# Patient Record
Sex: MALE | Race: WHITE | NOT HISPANIC OR LATINO | URBAN - METROPOLITAN AREA
[De-identification: names, ages, dates, MRNs, and addresses within clinical notes are randomized per-mention and may not be internally consistent; named-entity substitution may affect disease eponyms.]

---

## 2024-07-12 ENCOUNTER — HOSPITAL ENCOUNTER (EMERGENCY)
Facility: HOSPITAL | Age: 34
Discharge: HOME | End: 2024-07-12
Attending: EMERGENCY MEDICINE

## 2024-07-12 VITALS
SYSTOLIC BLOOD PRESSURE: 174 MMHG | HEIGHT: 70 IN | RESPIRATION RATE: 59 BRPM | WEIGHT: 200 LBS | TEMPERATURE: 99 F | BODY MASS INDEX: 28.63 KG/M2 | OXYGEN SATURATION: 98 % | HEART RATE: 88 BPM | DIASTOLIC BLOOD PRESSURE: 70 MMHG

## 2024-07-12 DIAGNOSIS — R09.82 ALLERGIC RHINITIS WITH POSTNASAL DRIP: ICD-10-CM

## 2024-07-12 DIAGNOSIS — R03.0 ELEVATED BLOOD PRESSURE READING: ICD-10-CM

## 2024-07-12 DIAGNOSIS — R31.9 HEMATURIA, UNSPECIFIED TYPE: Primary | ICD-10-CM

## 2024-07-12 DIAGNOSIS — J30.9 ALLERGIC RHINITIS WITH POSTNASAL DRIP: ICD-10-CM

## 2024-07-12 DIAGNOSIS — E86.0 DEHYDRATION: ICD-10-CM

## 2024-07-12 LAB
ALBUMIN SERPL BCP-MCNC: 4.5 G/DL (ref 3.5–5.2)
ALP SERPL-CCNC: 65 U/L (ref 55–135)
ALT SERPL W/O P-5'-P-CCNC: 19 U/L (ref 10–44)
ANION GAP SERPL CALC-SCNC: 13 MMOL/L (ref 8–16)
AST SERPL-CCNC: 21 U/L (ref 10–40)
BASOPHILS # BLD AUTO: 0.04 K/UL (ref 0–0.2)
BASOPHILS NFR BLD: 0.3 % (ref 0–1.9)
BILIRUB SERPL-MCNC: 1.5 MG/DL (ref 0.1–1)
BILIRUB UR QL STRIP: NEGATIVE
BUN SERPL-MCNC: 10 MG/DL (ref 6–20)
CALCIUM SERPL-MCNC: 9.7 MG/DL (ref 8.7–10.5)
CHLORIDE SERPL-SCNC: 100 MMOL/L (ref 95–110)
CK SERPL-CCNC: 103 U/L (ref 20–200)
CLARITY UR: CLEAR
CO2 SERPL-SCNC: 21 MMOL/L (ref 23–29)
COLOR UR: COLORLESS
CREAT SERPL-MCNC: 0.9 MG/DL (ref 0.5–1.4)
DIFFERENTIAL METHOD BLD: ABNORMAL
EOSINOPHIL # BLD AUTO: 0.1 K/UL (ref 0–0.5)
EOSINOPHIL NFR BLD: 0.5 % (ref 0–8)
ERYTHROCYTE [DISTWIDTH] IN BLOOD BY AUTOMATED COUNT: 11.9 % (ref 11.5–14.5)
EST. GFR  (NO RACE VARIABLE): >60 ML/MIN/1.73 M^2
GLUCOSE SERPL-MCNC: 93 MG/DL (ref 70–110)
GLUCOSE UR QL STRIP: NEGATIVE
HCT VFR BLD AUTO: 43 % (ref 40–54)
HGB BLD-MCNC: 15.2 G/DL (ref 14–18)
HGB UR QL STRIP: ABNORMAL
IMM GRANULOCYTES # BLD AUTO: 0.04 K/UL (ref 0–0.04)
IMM GRANULOCYTES NFR BLD AUTO: 0.3 % (ref 0–0.5)
KETONES UR QL STRIP: NEGATIVE
LACTATE SERPL-SCNC: 1 MMOL/L (ref 0.5–2.2)
LEUKOCYTE ESTERASE UR QL STRIP: NEGATIVE
LYMPHOCYTES # BLD AUTO: 1.1 K/UL (ref 1–4.8)
LYMPHOCYTES NFR BLD: 7.6 % (ref 18–48)
MAGNESIUM SERPL-MCNC: 1.8 MG/DL (ref 1.6–2.6)
MCH RBC QN AUTO: 33.6 PG (ref 27–31)
MCHC RBC AUTO-ENTMCNC: 35.3 G/DL (ref 32–36)
MCV RBC AUTO: 95 FL (ref 82–98)
MICROSCOPIC COMMENT: NORMAL
MONOCYTES # BLD AUTO: 1.2 K/UL (ref 0.3–1)
MONOCYTES NFR BLD: 8.2 % (ref 4–15)
NEUTROPHILS # BLD AUTO: 12.6 K/UL (ref 1.8–7.7)
NEUTROPHILS NFR BLD: 83.1 % (ref 38–73)
NITRITE UR QL STRIP: NEGATIVE
NRBC BLD-RTO: 0 /100 WBC
OHS QRS DURATION: 92 MS
OHS QTC CALCULATION: 404 MS
PH UR STRIP: 7 [PH] (ref 5–8)
PHOSPHATE SERPL-MCNC: 2.2 MG/DL (ref 2.7–4.5)
PLATELET # BLD AUTO: 201 K/UL (ref 150–450)
PMV BLD AUTO: 9.8 FL (ref 9.2–12.9)
POTASSIUM SERPL-SCNC: 3.8 MMOL/L (ref 3.5–5.1)
PROT SERPL-MCNC: 8.1 G/DL (ref 6–8.4)
PROT UR QL STRIP: NEGATIVE
RBC # BLD AUTO: 4.53 M/UL (ref 4.6–6.2)
RBC #/AREA URNS HPF: 0 /HPF (ref 0–4)
SODIUM SERPL-SCNC: 134 MMOL/L (ref 136–145)
SP GR UR STRIP: <1.005 (ref 1–1.03)
URN SPEC COLLECT METH UR: ABNORMAL
UROBILINOGEN UR STRIP-ACNC: NEGATIVE EU/DL
WBC # BLD AUTO: 15.09 K/UL (ref 3.9–12.7)

## 2024-07-12 PROCEDURE — 85025 COMPLETE CBC W/AUTO DIFF WBC: CPT

## 2024-07-12 PROCEDURE — 93005 ELECTROCARDIOGRAM TRACING: CPT

## 2024-07-12 PROCEDURE — 83735 ASSAY OF MAGNESIUM: CPT

## 2024-07-12 PROCEDURE — 80053 COMPREHEN METABOLIC PANEL: CPT

## 2024-07-12 PROCEDURE — 96361 HYDRATE IV INFUSION ADD-ON: CPT

## 2024-07-12 PROCEDURE — 83605 ASSAY OF LACTIC ACID: CPT | Performed by: EMERGENCY MEDICINE

## 2024-07-12 PROCEDURE — 93010 ELECTROCARDIOGRAM REPORT: CPT | Mod: ,,, | Performed by: INTERNAL MEDICINE

## 2024-07-12 PROCEDURE — 82550 ASSAY OF CK (CPK): CPT | Performed by: EMERGENCY MEDICINE

## 2024-07-12 PROCEDURE — 25000003 PHARM REV CODE 250

## 2024-07-12 PROCEDURE — 87491 CHLMYD TRACH DNA AMP PROBE: CPT

## 2024-07-12 PROCEDURE — 84100 ASSAY OF PHOSPHORUS: CPT

## 2024-07-12 PROCEDURE — 25000003 PHARM REV CODE 250: Performed by: EMERGENCY MEDICINE

## 2024-07-12 PROCEDURE — 99285 EMERGENCY DEPT VISIT HI MDM: CPT | Mod: 25

## 2024-07-12 PROCEDURE — 81000 URINALYSIS NONAUTO W/SCOPE: CPT

## 2024-07-12 PROCEDURE — 87591 N.GONORRHOEAE DNA AMP PROB: CPT

## 2024-07-12 PROCEDURE — 96360 HYDRATION IV INFUSION INIT: CPT

## 2024-07-12 RX ORDER — SODIUM CHLORIDE 0.65 %
1 AEROSOL, SPRAY (ML) NASAL
Qty: 30 ML | Refills: 0 | Status: SHIPPED | OUTPATIENT
Start: 2024-07-12

## 2024-07-12 RX ORDER — IBUPROFEN 600 MG/1
600 TABLET ORAL EVERY 6 HOURS PRN
Qty: 20 TABLET | Refills: 0 | Status: SHIPPED | OUTPATIENT
Start: 2024-07-12

## 2024-07-12 RX ORDER — LORATADINE 10 MG/1
10 TABLET ORAL DAILY
Qty: 60 TABLET | Refills: 0 | Status: SHIPPED | OUTPATIENT
Start: 2024-07-12 | End: 2025-07-12

## 2024-07-12 RX ORDER — FLUTICASONE PROPIONATE 50 MCG
1 SPRAY, SUSPENSION (ML) NASAL 2 TIMES DAILY
Qty: 16 G | Refills: 0 | Status: SHIPPED | OUTPATIENT
Start: 2024-07-12

## 2024-07-12 RX ORDER — ACETAMINOPHEN 500 MG
1000 TABLET ORAL EVERY 6 HOURS PRN
Qty: 30 TABLET | Refills: 0 | Status: SHIPPED | OUTPATIENT
Start: 2024-07-12

## 2024-07-12 RX ORDER — CYCLOBENZAPRINE HCL 10 MG
10 TABLET ORAL 3 TIMES DAILY PRN
Qty: 15 TABLET | Refills: 0 | Status: SHIPPED | OUTPATIENT
Start: 2024-07-12 | End: 2024-07-17

## 2024-07-12 RX ADMIN — SODIUM CHLORIDE 1000 ML: 9 INJECTION, SOLUTION INTRAVENOUS at 01:07

## 2024-07-12 RX ADMIN — SODIUM CHLORIDE 1000 ML: 9 INJECTION, SOLUTION INTRAVENOUS at 12:07

## 2024-07-12 NOTE — DISCHARGE INSTRUCTIONS
Please monitor your blood pressure at home.  Record values daily.  Please follow up with primary care physician and show them the daily values of your blood pressure at home.  Please establish a primary care physician.  You need repeat test to ensure that the blood in the urine resolves.  Please sign up for and monitor all results on [facility] patient portal.    Please return to the ED for any new, concerning symptoms, or worsening symptoms.    Please follow-up with your primary care provider within 1 day.  An ER visit can not replace the evaluation by your primary care physician.    In the emergency department it is our goal to rule out life-threatening conditions and make sure that you are safe to be discharged home.    Many medical conditions are difficult to diagnose and may be impossible to diagnosed during a single ER visit.  Many health conditions start with nonspecific symptoms and can only be diagnosed on follow-up visits with your primary care physician or specialist.    Please be aware that all medical conditions can change and we can not predict how you will be feeling tomorrow or the next day.   If you have any worsening or new symptoms you should not hesitate to return to the emergency department for re-evaluation.  Be sure to follow up with your primary care physician for recheck and review any labs or imaging that were performed today.  It is very common for us to identify non emergent incidental findings on labs and imaging which must be followed up with your primary care physician.   If you do not have a primary care physician, you may contact the 1 listed on your discharge paperwork or you can also call the [facility] appointment desk at [phone] to schedule an appointment.

## 2024-07-12 NOTE — ED NOTES
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  Pt states has had a decrease in urination and its "tea" colored.  sent pt to ED for evaluation of dehydration and possible rhabdo.  Pt is AAOx3, resp even and unlabored, skin warm and dry.  HOB elevated, SR up x 2, call bell within reach.    "

## 2024-07-12 NOTE — ED PROVIDER NOTES
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    Appearance, UA 07/12/2024 Clear  Clear Final    pH, UA 07/12/2024 7.0  5.0 - 8.0 Final    Specific Gravity, UA 07/12/2024 <1.005 (A)  1.005 - 1.030 Final    Protein, UA 07/12/2024 Negative  Negative Final    Comment: Recommend a 24 hour urine protein or a urine   protein/creatinine ratio if globulin induced proteinuria is  clinically suspected.      Glucose, UA 07/12/2024 Negative  Negative Final    Ketones, UA 07/12/2024 Negative  Negative Final    Bilirubin (UA) 07/12/2024 Negative  Negative Final    Occult Blood UA 07/12/2024 1+ (A)  Negative Final    Nitrite, UA 07/12/2024 Negative  Negative Final    Urobilinogen, UA 07/12/2024 Negative  <2.0 EU/dL Final    Leukocytes, UA 07/12/2024 Negative  Negative Final    WBC 07/12/2024 15.09 (H)  3.90 - 12.70 K/uL Final    RBC 07/12/2024 4.53 (L)  4.60 - 6.20 M/uL Final    Hemoglobin 07/12/2024 15.2  14.0 - 18.0 g/dL Final    Hematocrit 07/12/2024 43.0  40.0 - 54.0 % Final    MCV 07/12/2024 95  82 - 98 fL Final    MCH 07/12/2024 33.6 (H)  27.0 - 31.0 pg Final    MCHC 07/12/2024 35.3  32.0 - 36.0 g/dL Final    RDW 07/12/2024 11.9  11.5 - 14.5 % Final    Platelets 07/12/2024 201  150 - 450 K/uL Final    MPV 07/12/2024 9.8  9.2 - 12.9 fL Final    Immature Granulocytes 07/12/2024 0.3  0.0 - 0.5 % Final    Gran # (ANC)  07/12/2024 12.6 (H)  1.8 - 7.7 K/uL Final    Immature Grans (Abs) 07/12/2024 0.04  0.00 - 0.04 K/uL Final    Comment: Mild elevation in immature granulocytes is non specific and   can be seen in a variety of conditions including stress response,   acute inflammation, trauma and pregnancy. Correlation with other   laboratory and clinical findings is essential.      Lymph # 07/12/2024 1.1  1.0 - 4.8 K/uL Final    Mono # 07/12/2024 1.2 (H)  0.3 - 1.0 K/uL Final    Eos # 07/12/2024 0.1  0.0 - 0.5 K/uL Final    Baso # 07/12/2024 0.04  0.00 - 0.20 K/uL Final    nRBC 07/12/2024 0  0 /100 WBC Final    Gran % 07/12/2024 83.1 (H)  38.0 - 73.0 % Final    Lymph % 07/12/2024 7.6 (L)  18.0 - 48.0 % Final    Mono % 07/12/2024 8.2  4.0 - 15.0 % Final    Eosinophil % 07/12/2024 0.5  0.0 - 8.0 % Final    Basophil % 07/12/2024 0.3  0.0 - 1.9 % Final    Differential Method 07/12/2024 Automated   Final    Sodium 07/12/2024 134 (L)  136 - 145 mmol/L Final    Potassium 07/12/2024 3.8  3.5 - 5.1 mmol/L Final    Chloride 07/12/2024 100  95 - 110 mmol/L Final    CO2 07/12/2024 21 (L)  23 - 29 mmol/L Final    Glucose 07/12/2024 93  70 - 110 mg/dL Final    BUN 07/12/2024 10  6 - 20 mg/dL Final    Creatinine 07/12/2024 0.9  0.5 - 1.4 mg/dL Final    Calcium 07/12/2024 9.7  8.7 - 10.5 mg/dL Final    Total Protein 07/12/2024 8.1  6.0 - 8.4 g/dL Final    Albumin 07/12/2024 4.5  3.5 - 5.2 g/dL Final    Total Bilirubin 07/12/2024 1.5 (H)  0.1 - 1.0 mg/dL Final    Comment: For infants and newborns, interpretation of results should be based  on gestational age, weight and in agreement with clinical  observations.    Premature Infant recommended reference ranges:  Up to 24 hours.............<8.0 mg/dL  Up to 48 hours............<12.0 mg/dL  3-5 days..................<15.0 mg/dL  6-29 days.................<15.0 mg/dL      Alkaline Phosphatase 07/12/2024 65  55 - 135 U/L Final    AST 07/12/2024 21  10 - 40 U/L Final    ALT 07/12/2024 19  10 - 44 U/L Final     eGFR 07/12/2024 >60  >60 mL/min/1.73 m^2 Final    Anion Gap 07/12/2024 13  8 - 16 mmol/L Final    Magnesium 07/12/2024 1.8  1.6 - 2.6 mg/dL Final    Phosphorus 07/12/2024 2.2 (L)  2.7 - 4.5 mg/dL Final    CPK 07/12/2024 103  20 - 200 U/L Final    Lactate (Lactic Acid) 07/12/2024 1.0  0.5 - 2.2 mmol/L Final    Comment: Falsely low lactic acid results can be found in samples   containing >=13.0 mg/dL total bilirubin and/or >=3.5 mg/dL   direct bilirubin.      RBC, UA 07/12/2024 0  0 - 4 /hpf Final    Microscopic Comment 07/12/2024 SEE COMMENT   Final    Comment: Other formed elements not mentioned in the report are not   present in the microscopic examination.           Imaging Results              CT Renal Stone Study ABD Pelvis WO (Final result)  Result time 07/12/24 14:51:00      Final result by Max Kendall MD (07/12/24 14:51:00)                   Impression:      1. No emergent/acute abdominopelvic findings appreciated.  2. Submucosal fat deposition throughout the colon and terminal ileum, nonspecific findings that are most commonly seen in the setting of chronic repeated bouts of colitis.  3. Diffuse hepatic steatosis.      Electronically signed by: Max Kendall  Date:    07/12/2024  Time:    14:51               Narrative:    EXAMINATION:  CT RENAL STONE STUDY ABD PELVIS WO    CLINICAL HISTORY:  Flank pain, not otherwise specified as to site/location, laterality acuity etc..  Kidney stone suspected.  hematuria with right lower quadrant abdominal pain.  Pain abdominal unsp.    TECHNIQUE:  Low dose axial images, sagittal and coronal reformations were obtained from the lung bases to the pubic symphysis. IV and oral contrast were not utilized, limiting evaluation of soft tissue structures, vascular structures and hollow viscus organs.    COMPARISON:  None    FINDINGS:  Heart: Heart is not enlarged. No significant pericardial thickening in the field of view.    Lung Bases: Imaged portions of the lung bases are  clear.    Hepatobiliary: Diffusely decreased hepatic parenchymal attenuation.  Otherwise, the liver is normal in size and morphology without appreciable surface nodularity. No sizable suspicious mass on this limited non-enhanced exam.  Gallbladder is grossly unremarkable without radiodense stones, mural thickening, pericholecystic fluid or pericholecystic fat stranding.  No biliary dilatation.    Pancreas: Pancreas is normal in size and morphology without appreciable surrounding fat stranding, sizable fluid or sizable suspicious mass on this partially limited non-enhanced exam.    Spleen: Spleen is normal in size and morphology without appreciable sizable parenchymal mass on this non-enhanced exam.    Adrenals: Adrenal glands are symmetric and normal in size and morphology without appreciable nodularity.    Kidneys: Kidneys are normal in location, size, attenuation and morphology. No obstructing nephrolith or hydroureteronephrosis bilaterally.  No appreciable sizable suspicious mass on this limited non-enhanced exam.    Bladder: Urinary bladder is well distended and is grossly unremarkable without evidence of mural thickening or nodularity.    GI Tract: Submucosal fat deposition throughout the colon and terminal ileum.  Otherwise, the unenhanced and unopacified stomach, small bowel and colon are normal in course and caliber without evidence of bowel obstruction. No significant fat stranding. No free air. No free fluid. Appendix is normal.    Mesentery: Grossly unremarkable without free fluid or sizable suspicious mass on this non-enhanced exam.    Retroperitoneum: Aorta is normal in course and caliber without evidence of aneurysmal degeneration.  No sizable suspicious retroperitoneal mass, fluid collection or lymphadenopathy.    Pelvis: Reproductive organs are grossly unremarkable.  No appreciable sizable pelvic mass on this partially limited nonenhanced exam.  No appreciable free fluid or organized/drainable fluid  collection.  No pelvic lymphadenopathy.    Soft tissues: Imaged/visualized soft tissues are grossly unremarkable.    Osseous structures: No acute displaced fracture, dislocation or suspicious lytic/blastic osseous lesions.                                       X-Ray Chest AP Portable (Final result)  Result time 07/12/24 14:19:32      Final result by Max Kendall MD (07/12/24 14:19:32)                   Impression:      1. No acute cardiopulmonary process appreciated.      Electronically signed by: Max Kendall  Date:    07/12/2024  Time:    14:19               Narrative:    EXAMINATION:  XR CHEST AP PORTABLE    CLINICAL HISTORY:  Essential (primary) hypertension    TECHNIQUE:  Single frontal portable view of the chest was performed.    COMPARISON:  None    FINDINGS:  Cardiomediastinal silhouette is within normal limits.    No focal consolidation, overt interstitial edema, sizable pleural effusion or pneumothorax.    Visualized osseous structures are grossly unremarkable.                                            Scribe Attestation:   Scribe #1: I performed the above scribed service and the documentation accurately describes the services I performed. I attest to the accuracy of the note.        ED Course as of 07/12/24 1845   Fri Jul 12, 2024   1355 No STEMI. Normal sinus rhythm. Rate of 82. Normal EKG. Rightward Axis. . No prior EKG for comparison. [MO]      ED Course User Index  [MO] Jesika Abreu, Dr. Linh Andujar, personally performed the services described in this documentation. This document was produced by a scribe under my direction and in my presence. All medical record entries made by the scribe were at my direction and in my presence.  I have reviewed the chart and agree that the record reflects my personal performance and is accurate and complete. Linh Andujar DO.     07/12/2024 6:43 PM        Clinical Impression:  Final diagnoses:  [E86.0]  Dehydration  [R31.9] Hematuria, unspecified type (Primary)  [R03.0] Elevated blood pressure reading  [J30.9, R09.82] Allergic rhinitis with postnasal drip          ED Disposition Condition    Discharge Stable          ED Prescriptions       Medication Sig Dispense Start Date End Date Auth. Provider    fluticasone propionate (FLONASE) 50 mcg/actuation nasal spray 1 spray (50 mcg total) by Each Nostril route 2 (two) times daily. 16 g 7/12/2024 -- Linh Andujar DO    loratadine (CLARITIN) 10 mg tablet Take 1 tablet (10 mg total) by mouth once daily. 60 tablet 7/12/2024 7/12/2025 Linh Andujar DO    sodium chloride (OCEAN NASAL) 0.65 % nasal spray 1 spray by Nasal route every 3 (three) hours as needed for Congestion. 30 mL 7/12/2024 -- Linh Andujar DO    ibuprofen (ADVIL,MOTRIN) 600 MG tablet Take 1 tablet (600 mg total) by mouth every 6 (six) hours as needed for Pain (Take with food as needed for mild-to-moderate pain). 20 tablet 7/12/2024 -- Linh Andujar DO    acetaminophen (TYLENOL) 500 MG tablet Take 2 tablets (1,000 mg total) by mouth every 6 (six) hours as needed for Pain. 30 tablet 7/12/2024 -- Linh Andujar DO    cyclobenzaprine (FLEXERIL) 10 MG tablet Take 1 tablet (10 mg total) by mouth 3 (three) times daily as needed for Muscle spasms (As needed for pain and spasm). 15 tablet 7/12/2024 7/17/2024 Linh Andujar DO          Follow-up Information       Follow up With Specialties Details Why Contact Info Additional Information    [facility] Family Medicine Family Medicine Schedule an appointment as soon as possible for a visit in 1 day Please establish a primary care physician [address], [address]  [facility] [address] [phone] Please park in Lot [address] or D and use [address]ha [address]. Take [address] elevators.    [facility] -  Schedule an appointment as soon as possible for a visit in 1 day  [address] [place] [address] [phone]       [facility]  Urology Urology Schedule an appointment as soon as possible for a visit in 1 day  [address]  [address]  [facility] [address] [phone] Please park in garage or [address] surface lot and check in at Medical [address].     [facility] - Emergency Dept Emergency Medicine Go to  Please go to [facility] emergency department if symptoms worsen [address]  [facility] [place] [id number] [phone]              Linh Andujar,   07/12/24 6595

## 2024-07-12 NOTE — Clinical Note
"Tejinder Sandersonmarge Hannah was seen and treated in our emergency department on 7/12/2024.  He may return to work on 07/13/2024.       If you have any questions or concerns, please don't hesitate to call.      Linh Andujar, DO"

## 2024-07-16 LAB
C TRACH DNA SPEC QL NAA+PROBE: NOT DETECTED
N GONORRHOEA DNA SPEC QL NAA+PROBE: NOT DETECTED

## 2024-09-25 ENCOUNTER — OFFICE VISIT (OUTPATIENT)
Dept: URGENT CARE | Facility: CLINIC | Age: 34
End: 2024-09-25

## 2024-09-25 VITALS
HEART RATE: 74 BPM | WEIGHT: 200 LBS | HEIGHT: 70 IN | BODY MASS INDEX: 28.63 KG/M2 | TEMPERATURE: 99 F | RESPIRATION RATE: 20 BRPM | OXYGEN SATURATION: 99 % | DIASTOLIC BLOOD PRESSURE: 95 MMHG | SYSTOLIC BLOOD PRESSURE: 136 MMHG

## 2024-09-25 DIAGNOSIS — J06.9 VIRAL URI: Primary | ICD-10-CM

## 2024-09-25 PROCEDURE — 99214 OFFICE O/P EST MOD 30 MIN: CPT | Mod: TIER,S$GLB,, | Performed by: NURSE PRACTITIONER

## 2024-09-25 NOTE — PATIENT INSTRUCTIONS
"  Viral URI (upper respiratory infection):    Your symptoms are viral in nature.  Viral upper respiratory infections typically run their course in 7-10 days.     - Rest at home.     - Drink plenty of fluids so you won't get dehydrated.      Avoid taking Decongestants such as pseudoephedrine (ex. Sudafed) or phenylephrine (ex. Mucinex FastMax, Dayquil, Nyquil, or any combo cold meds that say "cold," "sinus" or "-D").        - Cough recommendations:   Warm tea with honey can help with cough. Honey is a natural cough suppressant.  - Dextromethorphan (DM) is a cough suppressant over the counter (ie. mucinex DM OR delsym).        - Congestion recommendations:      - Mucinex (guaifenesin) twice a day (or as directed) to help loosen mucous.       - Fever/Pain recommendations:  Alternate Tylenol or Ibuprofen as directed for fever/pain.   - Motrin/Ibuprofen every 6-8 hours for pain and inflammation. Do not take ibuprofen if you have a history of GI bleeding, kidney disease, or if you take blood thinners.    - Tylenol/acetaminophen every 6-8 hours for added pain relief.  Avoid tylenol if you have a history of liver disease.       -Sore throat recommendations: Warm fluids, warm salt water gargles, throat lozenges, tea, honey, soup, or drinking something cold or frozen.  Throat lozenges or sprays help reduce pain. Gargling with warm saltwater (1/4 teaspoon of salt in 1/2 cup of warm water) or an OTC anesthetic gargle may be useful for irritation.    When to seek medical advice  Call your healthcare provider right away if any of these occur:  Fever that is poorly controlled with OTC fever reducing medication  New or worsening ear pain, sinus pain, or headache  Stiff neck  You can't swallow liquids or you can't open your mouth wide because of throat pain  Signs of dehydration. These include very dark urine or no urine, sunken eyes, and dizziness.  Trouble breathing or noisy breathing  Muffled voice  Rash     If your symptoms " worsen or fail to improve you should go to Emergency Department.

## 2024-09-25 NOTE — LETTER
September 25, 2024      [facility]  [address] [facility] [address]  Phone: [phone]  Fax: [phone]       Patient: Tejinder Hannah   YOB: 1990  Date of Visit: 09/25/2024    To Whom It May Concern:    Anastasiia Hannah  was at [facility] on 09/25/2024. The patient may return to work on 9/26/2024 with no restrictions. Please excuse from work 9/24 and 9/25.  If you have any questions or concerns, or if I can be of further assistance, please do not hesitate to contact me.    Sincerely,    CALOS AvendanoC

## 2024-09-25 NOTE — PROGRESS NOTES
"Subjective:      Patient ID: Tejinder Hannah Jr. is a 34 y.o. male.    Vitals:  height is 5' 10" (1.778 m) and weight is 90.7 kg (200 lb). His temperature is 99.1 °F (37.3 °C). His blood pressure is 136/95 (abnormal) and his pulse is 74. His respiration is 20 and oxygen saturation is 99%.     Chief Complaint: Nasal Congestion      Pt is a 35 yo male presenting with headache, congestion, cough.  Onset of symptoms was 3-4 days ago and symptoms are improving.   Pt states he has not taken any medication for his symptoms.  Pt denied any covid or flu testing.  Pt is requesting a dr note for work    Cough  This is a new problem. The current episode started in the past 7 days. The problem has been unchanged. The problem occurs every few minutes. The cough is Non-productive. Pertinent negatives include no chest pain, chills, ear pain, fever, headaches, sore throat or shortness of breath. Nothing aggravates the symptoms. He has tried nothing for the symptoms. The treatment provided no relief.       Constitution: Negative for chills, fatigue and fever.   HENT:  Negative for ear pain, congestion, sinus pain and sore throat.    Cardiovascular:  Negative for chest pain.   Respiratory:  Positive for cough. Negative for sputum production and shortness of breath.    Gastrointestinal:  Negative for nausea, vomiting and diarrhea.   Neurological:  Negative for headaches.      Objective:     Physical Exam   Constitutional: He is oriented to person, place, and time.   HENT:   Head: Normocephalic.   Ears:   Right Ear: Hearing and external ear normal. No no drainage, swelling or tenderness. Tympanic membrane is not erythematous, not retracted and not bulging. No middle ear effusion.   Left Ear: Hearing and external ear normal. No no drainage, swelling or tenderness. Tympanic membrane is not erythematous, not retracted and not bulging.  No middle ear effusion.   Nose: Nose normal. No mucosal edema, rhinorrhea or purulent discharge. Right sinus " "exhibits no maxillary sinus tenderness and no frontal sinus tenderness. Left sinus exhibits no maxillary sinus tenderness and no frontal sinus tenderness.   Mouth/Throat: Uvula is midline, oropharynx is clear and moist and mucous membranes are normal. No uvula swelling. No oropharyngeal exudate, posterior oropharyngeal edema or posterior oropharyngeal erythema.   Eyes: EOM are normal.   Cardiovascular: Normal rate and regular rhythm.   Pulmonary/Chest: Effort normal and breath sounds normal. No respiratory distress.   Musculoskeletal: Normal range of motion.         General: Normal range of motion.   Neurological: He is alert and oriented to person, place, and time.   Skin: Skin is warm and dry.   Nursing note and vitals reviewed.      Assessment:     1. Viral URI        Plan:       Viral URI      Patient Instructions     Viral URI (upper respiratory infection):    Your symptoms are viral in nature.  Viral upper respiratory infections typically run their course in 7-10 days.     - Rest at home.     - Drink plenty of fluids so you won't get dehydrated.      Avoid taking Decongestants such as pseudoephedrine (ex. Sudafed) or phenylephrine (ex. Mucinex FastMax, Dayquil, Nyquil, or any combo cold meds that say "cold," "sinus" or "-D").        - Cough recommendations:   Warm tea with honey can help with cough. Honey is a natural cough suppressant.  - Dextromethorphan (DM) is a cough suppressant over the counter (ie. mucinex DM OR delsym).        - Congestion recommendations:      - Mucinex (guaifenesin) twice a day (or as directed) to help loosen mucous.       - Fever/Pain recommendations:  Alternate Tylenol or Ibuprofen as directed for fever/pain.   - Motrin/Ibuprofen every 6-8 hours for pain and inflammation. Do not take ibuprofen if you have a history of GI bleeding, kidney disease, or if you take blood thinners.    - Tylenol/acetaminophen every 6-8 hours for added pain relief.  Avoid tylenol if you have a history of " liver disease.       -Sore throat recommendations: Warm fluids, warm salt water gargles, throat lozenges, tea, honey, soup, or drinking something cold or frozen.  Throat lozenges or sprays help reduce pain. Gargling with warm saltwater (1/4 teaspoon of salt in 1/2 cup of warm water) or an OTC anesthetic gargle may be useful for irritation.    When to seek medical advice  Call your healthcare provider right away if any of these occur:  Fever that is poorly controlled with OTC fever reducing medication  New or worsening ear pain, sinus pain, or headache  Stiff neck  You can't swallow liquids or you can't open your mouth wide because of throat pain  Signs of dehydration. These include very dark urine or no urine, sunken eyes, and dizziness.  Trouble breathing or noisy breathing  Muffled voice  Rash     If your symptoms worsen or fail to improve you should go to Emergency Department.

## 2024-09-27 ENCOUNTER — TELEPHONE (OUTPATIENT)
Dept: URGENT CARE | Facility: CLINIC | Age: 34
End: 2024-09-27

## 2024-09-27 NOTE — TELEPHONE ENCOUNTER
Patient came in with note from Provided for two days off 09/24,09/25 and he requesting to get the note extended for 9/26 , 9/27 while returning to work on 09/28. Patient was treated for Sinus infection and would like to have the days extended if Possible .     Thanks   Jyoti

## 2024-09-30 ENCOUNTER — TELEPHONE (OUTPATIENT)
Dept: URGENT CARE | Facility: CLINIC | Age: 34
End: 2024-09-30

## 2024-09-30 NOTE — LETTER
September 30, 2024      [facility] [place]  [facility] [address]  Phone: [phone]  Fax: [phone]       Patient: Tejinder Hannah   YOB: 1990  Date of Visit: 09/30/2024    To Whom It May Concern:    Anastasiia Hannah  was at [facility] on 9/25/2024. The patient may return to work/school on 9/28/2024 with no restrictions. Please excuse from work 9/24 -9/27.  If you have any questions or concerns, or if I can be of further assistance, please do not hesitate to contact me.    Sincerely,    MOISÉS Avendano